# Patient Record
Sex: MALE | Race: BLACK OR AFRICAN AMERICAN | NOT HISPANIC OR LATINO | Employment: FULL TIME | URBAN - METROPOLITAN AREA
[De-identification: names, ages, dates, MRNs, and addresses within clinical notes are randomized per-mention and may not be internally consistent; named-entity substitution may affect disease eponyms.]

---

## 2023-08-23 ENCOUNTER — TELEPHONE (OUTPATIENT)
Dept: FAMILY MEDICINE CLINIC | Facility: CLINIC | Age: 38
End: 2023-08-23

## 2023-08-23 NOTE — TELEPHONE ENCOUNTER
Caller: Toney Marquez    Relationship: Self    Best call back number: [phone]     What is the best time to reach you: ANY- PLEASE LEAVE VOICEMAIL    Who are you requesting to speak with (clinical staff, provider,  specific staff member): TEODORO DOMÍNGUEZ OR HER NURSE    What was the call regarding: PATIENT IS WANTING TO SPEAK TO TEODORO DOMÍNGUEZ ABOUT HIS ANXIETY. HE SAID IT HAS BEEN BAD THE LAST 2-3 WEEKS. HE HAS HAD TO TAKE SOME TIME OFF WORK TO NOT MAKE IT WORSE. SHOULD HE COME IN FOR AN APPOINTMENT? IS THERE BLOOD WORK OR TESTS THAT CAN BE DONE? PLEASE ADVISE PATIENT.     Is it okay if the provider responds through MyChart: NO

## 2023-08-23 NOTE — TELEPHONE ENCOUNTER
"Lm for pt to call us back, hub please read: \"He will need to be seen.  Or he can call himself as a self referral to psychiatry.  Or he can go to [facility] ER for evaluation. Or to [facility] if they take his insurnance.  Let him know about the 988 national suicide hotline if needed.    Please call patient and make a note.  Thanks  Neyda Crespo, APRN \"  "

## 2023-08-23 NOTE — TELEPHONE ENCOUNTER
PATIENT CALLED IN I RELAYED THE HUB TO READ MESSAGE THE PATIENT SAYS THAT HE MADE AN APPOINTMENT FOR 08/28/20223 WITH THE OFFICE

## 2023-08-28 ENCOUNTER — OFFICE VISIT (OUTPATIENT)
Dept: FAMILY MEDICINE CLINIC | Facility: CLINIC | Age: 38
End: 2023-08-28
Payer: COMMERCIAL

## 2023-08-28 VITALS
OXYGEN SATURATION: 98 % | HEART RATE: 88 BPM | HEIGHT: 73 IN | TEMPERATURE: 97.8 F | BODY MASS INDEX: 26.59 KG/M2 | RESPIRATION RATE: 18 BRPM | SYSTOLIC BLOOD PRESSURE: 118 MMHG | WEIGHT: 200.6 LBS | DIASTOLIC BLOOD PRESSURE: 64 MMHG

## 2023-08-28 DIAGNOSIS — F41.9 ANXIETY: Primary | ICD-10-CM

## 2023-08-28 DIAGNOSIS — F32.A DEPRESSION, UNSPECIFIED DEPRESSION TYPE: ICD-10-CM

## 2023-08-28 PROCEDURE — 99213 OFFICE O/P EST LOW 20 MIN: CPT | Performed by: NURSE PRACTITIONER

## 2023-08-28 RX ORDER — BUPROPION HYDROCHLORIDE 150 MG/1
150 TABLET ORAL DAILY
Qty: 30 TABLET | Refills: 1 | Status: SHIPPED | OUTPATIENT
Start: 2023-08-28

## 2023-08-28 NOTE — PROGRESS NOTES
Chief Complaint  Anxiety (Anxiety issues )    Emma Marquez presents to [facility]  History of Present Illness  Patient is a 38-year-old male.  He is here with his young daughter.  He is here for complaint of anxiety issues.  He states he is currently working 10-hour shifts 5 to 6 days/week.  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  He states he is also taking business classes.  He is also in the process of trying to open a new business with his sibling.  He denies any thoughts of self-harm or suicide.  He does complain of feeling overwhelmed at times.  He does state he has never been on any medication for anxiety.    Anxiety  Presents for initial visit. Onset was 1 to 6 months ago. The problem has been gradually worsening. Symptoms include decreased concentration, depressed mood, irritability, nervous/anxious behavior, palpitations and restlessness. Patient reports no chest pain, compulsions, confusion, dizziness, dry mouth, excessive worry, feeling of choking, hyperventilation, impotence, insomnia, panic, shortness of breath or suicidal ideas. Symptoms occur occasionally. Duration: an hour or two. The patient sleeps 6 hours per night. The quality of sleep is fair. Nighttime awakenings: occasional.     There is no history of anemia, anxiety/panic attacks, arrhythmia, asthma, bipolar disorder, CAD, CHF, chronic lung disease, depression, fibromyalgia, hyperthyroidism or suicide attempts. Past treatments include lifestyle changes (getting away to relax). The treatment provided mild relief.     The following portions of the patient's history were reviewed and updated as appropriate: allergies, current medications, past family history, past medical history, past social history, past surgical history and problem list.    Review of Systems   Constitutional:  Positive for irritability.   HENT: Negative.     Respiratory: Negative.  Negative for shortness of breath.   "  Cardiovascular:  Positive for palpitations. Negative for chest pain.   Genitourinary: Negative.  Negative for impotence.   Musculoskeletal: Negative.    Skin: Negative.    Allergic/Immunologic: Negative.    Neurological:  Negative for dizziness.   Psychiatric/Behavioral:  Positive for decreased concentration. Negative for confusion and suicidal ideas. The patient is nervous/anxious. The patient does not have insomnia.        Objective   Vital Signs:   /64 (BP Location: Right arm, Patient Position: Sitting, Cuff Size: Adult)   Pulse 88   Temp 97.8 øF (36.6 øC) (Temporal)   Resp 18   Ht 185.4 cm (73\")   Wt 91 kg (200 lb 9.6 oz)   SpO2 98%   BMI 26.47 kg/mý    BMI is >= 25 and <30. (Overweight) The following options were offered after discussion;: nutrition counseling/recommendations      PHQ-2/9 Depression Screening  PHQ-9 Total Score: 14    YARY-7 Anxiety Screening  YARY-7  Feeling nervous, anxious or on edge: More than half the days  Not being able to stop or control worrying: Nearly every day  Worrying too much about different things: Nearly every day  Trouble Relaxing: More than half the days  Being so restless that it is hard to sit still: More than half the days  Feeling afraid as if something awful might happen: More than half the days  Becoming easily annoyed or irritable: Nearly every day  YARY 7 Total Score: 17  If you checked any problems, how difficult have these problems made it for you to do your work, take care of things at home, or get along with other people: Very difficult          Physical Exam  Vitals reviewed.   Constitutional:       Appearance: He is well-developed. He is not ill-appearing.   HENT:      Head: Normocephalic.   Eyes:      Conjunctiva/sclera: Conjunctivae normal.      Pupils: Pupils are equal, round, and reactive to light.   Cardiovascular:      Rate and Rhythm: Normal rate and regular rhythm.      Heart sounds: Normal heart sounds.   Pulmonary:      Effort: Pulmonary " effort is normal.      Breath sounds: Normal breath sounds.   Musculoskeletal:      Cervical back: Normal range of motion.   Lymphadenopathy:      Cervical: No cervical adenopathy.   Skin:     General: Skin is warm and dry.      Capillary Refill: Capillary refill takes less than 2 seconds.   Neurological:      Mental Status: He is alert and oriented to person, place, and time.   Psychiatric:         Mood and Affect: Mood normal.         Speech: Speech normal.         Behavior: Behavior normal. Behavior is cooperative.         Thought Content: Thought content normal.         Judgment: Judgment normal.      Result Review :               There are outstanding labs to be obtained. Patient states he's not had time to get them drawn. Discussed importance of obtaining. He is agreeable.     Assessment and Plan    Diagnoses and all orders for this visit:    1. Anxiety (Primary)  -     Ambulatory Referral to Psychiatry  -     buPROPion XL (WELLBUTRIN XL) 150 MG 24 hr tablet; Take 1 tablet by mouth Daily.  Dispense: 30 tablet; Refill: 1    2. Depression, unspecified depression type    His PHQ-9 score was 14 and his YARY-7 score is 17.   Discussed behavioral cognitive therapy. Discussed prn Hydroxyzine. May make sleeping. Other option propranolol. Temporary.   Discussed Zoloft and Wellbutrin.   He would like to try Wellbutrin. See instruction sheet. Referral the psychiatry.   Will start on Wellbutrin. Psychiatry can take over prescribing.   Follow up in 4 weeks and as needed.   He denies any thoughts of self harm or suicide.   Please follow up after getting blood work completed.       Follow Up   Return in about 4 weeks (around 9/25/2023), or if symptoms worsen or fail to improve.  Patient was given instructions and counseling regarding his condition or for health maintenance advice. Please see specific information pulled into the AVS if appropriate.